# Patient Record
Sex: FEMALE | Race: BLACK OR AFRICAN AMERICAN | Employment: OTHER | URBAN - METROPOLITAN AREA
[De-identification: names, ages, dates, MRNs, and addresses within clinical notes are randomized per-mention and may not be internally consistent; named-entity substitution may affect disease eponyms.]

---

## 2017-03-01 ENCOUNTER — OP HISTORICAL/CONVERTED ENCOUNTER (OUTPATIENT)
Dept: OTHER | Age: 57
End: 2017-03-01

## 2021-12-28 ENCOUNTER — OFFICE VISIT (OUTPATIENT)
Dept: CARDIOLOGY CLINIC | Age: 61
End: 2021-12-28
Payer: COMMERCIAL

## 2021-12-28 VITALS
BODY MASS INDEX: 38.89 KG/M2 | OXYGEN SATURATION: 99 % | WEIGHT: 242 LBS | DIASTOLIC BLOOD PRESSURE: 88 MMHG | HEART RATE: 73 BPM | SYSTOLIC BLOOD PRESSURE: 133 MMHG | HEIGHT: 66 IN

## 2021-12-28 DIAGNOSIS — R07.9 CHEST PAIN, UNSPECIFIED TYPE: Primary | ICD-10-CM

## 2021-12-28 DIAGNOSIS — I82.5Y1 CHRONIC DEEP VEIN THROMBOSIS (DVT) OF PROXIMAL VEIN OF RIGHT LOWER EXTREMITY (HCC): ICD-10-CM

## 2021-12-28 DIAGNOSIS — R06.02 SHORTNESS OF BREATH: ICD-10-CM

## 2021-12-28 PROCEDURE — 99204 OFFICE O/P NEW MOD 45 MIN: CPT | Performed by: INTERNAL MEDICINE

## 2021-12-28 RX ORDER — ALPRAZOLAM 0.5 MG/1
0.5 TABLET ORAL
COMMUNITY

## 2021-12-28 RX ORDER — BACLOFEN 10 MG/1
10 TABLET ORAL DAILY
COMMUNITY

## 2021-12-28 RX ORDER — BUDESONIDE AND FORMOTEROL FUMARATE DIHYDRATE 80; 4.5 UG/1; UG/1
2 AEROSOL RESPIRATORY (INHALATION) DAILY
COMMUNITY
Start: 2021-11-08

## 2021-12-28 RX ORDER — PILOCARPINE HYDROCHLORIDE 5 MG/1
5 TABLET, FILM COATED ORAL 3 TIMES DAILY
COMMUNITY
Start: 2021-01-19

## 2021-12-28 RX ORDER — MOMETASONE FUROATE 1 MG/G
1 OINTMENT TOPICAL
COMMUNITY
Start: 2021-10-20

## 2021-12-28 RX ORDER — AZELASTINE HCL 205.5 UG/1
1 SPRAY NASAL
COMMUNITY

## 2021-12-28 RX ORDER — ACETIC ACID 20.65 MG/ML
1 SOLUTION AURICULAR (OTIC)
COMMUNITY
Start: 2021-08-03

## 2021-12-28 RX ORDER — LIFITEGRAST 50 MG/ML
1 SOLUTION/ DROPS OPHTHALMIC
COMMUNITY

## 2021-12-28 RX ORDER — GOLIMUMAB 50 MG/4ML
2 SOLUTION INTRAVENOUS AS DIRECTED
COMMUNITY

## 2021-12-28 RX ORDER — TRIAMCINOLONE ACETONIDE 1 MG/G
1 CREAM TOPICAL
COMMUNITY
Start: 2021-09-07

## 2021-12-28 NOTE — PROGRESS NOTES
HISTORY OF PRESENT ILLNESS  Thanh Miller is a 57 y.o. female. 12/2021  Patient seen today for new patient evaluation. She is here to establish care. She will have episode of shortness with chest pain. Describes fatigue and tiredness with shortness of breath on exertion. Feels chest tightness and pressure sensation happens on and off. Not clearly related to activity exertion. No clear radiation of this pain. She has history of DVT with chronic Coumadin treatment followed by vascular. Review of Systems   Constitutional: Negative for chills and fever. HENT: Negative for nosebleeds. Eyes: Negative for blurred vision and double vision. Respiratory: Positive for shortness of breath. Negative for cough, hemoptysis, sputum production and wheezing. Cardiovascular: Positive for chest pain. Negative for palpitations, orthopnea, claudication, leg swelling and PND. Gastrointestinal: Negative for abdominal pain, heartburn, nausea and vomiting. Musculoskeletal: Negative for myalgias. Skin: Negative for rash. Neurological: Negative for dizziness, weakness and headaches. Endo/Heme/Allergies: Does not bruise/bleed easily. Family History   Problem Relation Age of Onset    Heart Failure Mother     Atrial Fibrillation Mother     Heart Attack Father 46    Cancer Brother     Heart Attack Brother 48       Past Medical History:   Diagnosis Date    Autoimmune disease ([address]     [address] [address]     Thyroid disease        Past Surgical History:   Procedure Laterality Date    HX CHOLECYSTECTOMY      HX GI      HX GYN      VASCULAR SURGERY PROCEDURE UNLIST         Social History     Tobacco Use    Smoking status: Current Some Day Smoker    Smokeless tobacco: Never Used   Substance Use Topics    Alcohol use: Yes       Allergies   Allergen Reactions    Penicillins Itching       Prior to Admission medications    Medication Sig Start Date End Date Taking?  Authorizing Provider baclofen (LIORESAL) 10 mg tablet Take 10 mg by mouth daily. Yes Provider, Historical   budesonide-formoteroL (SYMBICORT) 80-4.5 mcg/actuation HFAA Take 2 Puffs by inhalation daily. 11/8/21  Yes Provider, Historical   azelastine (ASTEPRO) 205.5 mcg (0.15 %) 1 [place] by Both Nostrils route daily as needed. Yes Provider, Historical   ALPRAZolam (XANAX) 0.5 mg tablet Take 0.5 mg by mouth daily as needed. Yes Provider, Historical   acetic acid (VOSOL) 2 % otic solution Administer 1 Drop into each ear daily as needed. 8/3/21  Yes Provider, Historical   lifitegrast (Xiidra) 5 % dpet Administer 1 Drop to both eyes daily as needed. Yes Provider, Historical   golimumab (Simponi ARIA) 12.5 mg/mL soln solution 2 mg/kg by IntraVENous route as directed. Every other month   Yes Provider, Historical   pilocarpine (SALAGEN) 5 mg tablet Take 5 mg by mouth three (3) times daily. 1/19/21  Yes Provider, Historical   mometasone (ELOCON) 0.1 % ointment Apply 1 Pump to affected area daily as needed. 10/20/21  Yes Provider, Historical   triamcinolone acetonide (KENALOG) 0.1 % topical cream Apply 1 Pump to affected area daily as needed. 9/7/21  Yes Provider, Historical   warfarin (COUMADIN) 10 mg tablet As directed 5/24/16  Yes Provider, Historical   levothyroxine (SYNTHROID) 175 mcg tablet Take 175 mcg by mouth Daily (before breakfast). 5/24/16  Yes Provider, Historical   hydroxychloroquine (PLAQUENIL) 200 mg tablet Take 200 mg by mouth two (2) times a day. 6/3/16  Yes Provider, Historical   furosemide (LASIX) 40 mg tablet Take 40 mg by mouth daily as needed. 5/24/16  Yes Provider, Historical   montelukast (SINGULAIR) 10 mg tablet Take 10 mg by mouth daily.  7/28/16  Yes Provider, Historical         Visit Vitals  /88 (BP 1 Location: Right arm, BP Patient Position: Sitting, BP Cuff Size: Large adult)   Pulse 73   Ht 5' 6\" (1.676 m)   Wt 109.8 kg (242 lb)   SpO2 99%   BMI 39.06 kg/m²         Physical Exam  Constitutional: Appearance: She is well-developed. HENT:      Head: Normocephalic and atraumatic. Eyes:      Conjunctiva/sclera: Conjunctivae normal.   Neck:      Thyroid: No thyromegaly. Vascular: No JVD. Trachea: No tracheal deviation. Cardiovascular:      Rate and Rhythm: Normal rate and regular rhythm. Chest Wall: PMI is not displaced. Pulses: No decreased pulses. Heart sounds: No murmur heard. No gallop. No S3 sounds. Pulmonary:      Effort: No respiratory distress. Breath sounds: No wheezing or rales. Chest:      Chest wall: No tenderness. Abdominal:      Palpations: Abdomen is soft. Tenderness: There is no abdominal tenderness. Musculoskeletal:      Cervical back: Neck supple. Skin:     General: Skin is warm. Neurological:      Mental Status: She is alert and oriented to person, place, and time. Ms. Sharlene Bro has a reminder for a \"due or due soon\" health maintenance. I have asked that she contact her primary care provider for follow-up on this health maintenance. No flowsheet data found. I have personally reviewed patient's records available from hospital and other providers and incorporated findings in patient care. Provider note, lab, DVT study, EKG, chest x-ray  Chest -- Nuclear Medicine       Impression 2017  Performed by RADIOLOGY       THIS MYOCARDIAL PERFUSION STUDY IS ABNORMAL    THIS IS A LOW RISK STUDY    A partially reversible anteroapical defect is present suggesting LAD distribution disease    The calculated LVEF was 57%.    Overall normal LV systolic function and wall motion with an EF of 57%    Right ventricle size appears unable to assess    Impression  Performed by RADIOLOGY  : Echo 2017 NORMAL LEFT VENTRICULAR CAVITY SIZE AND SYSTOLIC FUNCTION WITH AN EJECTION FRACTION OF 55 %.     MILD (GRADE I) DIASTOLIC DYSFUNCTION.    UNABLE TO ASSESS PULMONARY ARTERY PRESSURE DUE TO LACK OF TR JET.    STRUCTURALLY NORMAL MITRAL VALVE WITH MILD THICKENING/CALCIFICATION OF THE ANTERIOR    MITRAL VALVE LEAFLET.    MILDLY SCLEROTIC AORTIC VALVE WITH NO EVIDENCE OF AORTIC REGURGITATION OR STENOSIS.    TRACE MITRAL REGURGITATION.    NO EVIDENCE OF SIGNIFICANT TRICUSPID OR PULMONIC REGURGITATION.    NO PREVIOUS REPORT FOR COMPARISON. Assessment         ICD-10-CM ICD-9-CM    1. Chest pain, unspecified type  R07.9 786.50 ECHO ADULT COMPLETE      NUCLEAR CARDIAC STRESS TEST      CT HEART W/O CONT WITH CALCIUM      LIPID PANEL   2. Shortness of breath  R06.02 786.05 ECHO ADULT COMPLETE      NUCLEAR CARDIAC STRESS TEST      CT HEART W/O CONT WITH CALCIUM      LIPID PANEL   3. Chronic deep vein thrombosis (DVT) of proximal vein of right lower extremity (HCC)  I82.5Y1 453.51 CT HEART W/O CONT WITH CALCIUM   12/2021  Seen with new onset chest pain and shortness of breath history of chronic DVT. Strong family history of coronary artery disease. Medications Discontinued During This Encounter   Medication Reason    methotrexate (RHEUMATREX) 2.5 mg tablet Not A Current Medication    cevimeline (EVOXAC) 30 mg capsule Not A Current Medication    folic acid (FOLVITE) 1 mg tablet Not A Current Medication       Orders Placed This Encounter    CT HEART W/O CONT WITH CALCIUM     Standing Status:   Future     Standing Expiration Date:   1/28/2023    LIPID PANEL     Standing Status:   Future     Standing Expiration Date:   6/28/2022    ECHO ADULT COMPLETE     Standing Status:   Future     Standing Expiration Date:   12/28/2022     Order Specific Question:   Contrast Enhancement (Bubble Study, Definity, Optison) may be used if criteria listed in established evidence-based protocol has been identified. Answer:   Yes       Follow-up and Dispositions    · Return for F/u after tests.

## 2021-12-28 NOTE — PROGRESS NOTES
Terry Granados presents today for   Chief Complaint   Patient presents with    New Patient     self ref, consultation, old cardiologist retired    Palpitations     at times, racing    Family Problem     Family history of heart problems       Terry Granados preferred language for health care discussion is english/other. Is someone accompanying this pt? no    Is the patient using any DME equipment during [address]? no    Depression Screening:  3 most recent PHQ Screens 12/28/2021   Little interest or pleasure in doing things Not at all   Feeling down, depressed, irritable, or hopeless Not at all   Total Score PHQ 2 0       Learning Assessment:  Learning Assessment 12/28/2021   PRIMARY LEARNER Patient   PRIMARY LANGUAGE ENGLISH   LEARNER PREFERENCE PRIMARY DEMONSTRATION   ANSWERED BY patient   RELATIONSHIP SELF       Abuse Screening:  Abuse Screening Questionnaire 12/28/2021   Do you ever feel afraid of your partner? N   Are you in a relationship with someone who physically or mentally threatens you? N   Is it safe for you to go home? Y             Pt currently taking Anticoagulant therapy? Warfarin 10 mg daily except 5 mg on Tuesday **PCP follows, Patient checks INR at home**    Pt currently taking Antiplatelet therapy ? no      Coordination of Care:  1. Have you been to the ER, urgent care clinic since your last visit? Hospitalized since your last visit? no    2. Have you seen or consulted any other health care providers outside of the [address] [place] [address] since your last visit? Include any pap smears or colon screening.  no

## 2021-12-29 LAB
CHOLEST SERPL-MCNC: 172 MG/DL (ref 110–200)
HDLC SERPL-MCNC: 2.8 MG/DL (ref 0–5)
HDLC SERPL-MCNC: 62 MG/DL
LDL/HDL RATIO,LDHD: 1.6
LDLC SERPL CALC-MCNC: 99 MG/DL (ref 50–99)
NON-HDL CHOLESTEROL, 011976: 110 MG/DL
TRIGL SERPL-MCNC: 58 MG/DL (ref 40–149)
VLDLC SERPL CALC-MCNC: 12 MG/DL (ref 8–30)

## 2022-02-07 ENCOUNTER — OFFICE VISIT (OUTPATIENT)
Dept: CARDIOLOGY CLINIC | Age: 62
End: 2022-02-07
Payer: COMMERCIAL

## 2022-02-07 VITALS
DIASTOLIC BLOOD PRESSURE: 77 MMHG | WEIGHT: 247 LBS | BODY MASS INDEX: 39.87 KG/M2 | SYSTOLIC BLOOD PRESSURE: 124 MMHG | OXYGEN SATURATION: 100 % | HEART RATE: 75 BPM

## 2022-02-07 DIAGNOSIS — R06.02 SHORTNESS OF BREATH: ICD-10-CM

## 2022-02-07 DIAGNOSIS — R07.9 CHEST PAIN, UNSPECIFIED TYPE: Primary | ICD-10-CM

## 2022-02-07 PROCEDURE — 99213 OFFICE O/P EST LOW 20 MIN: CPT | Performed by: INTERNAL MEDICINE

## 2022-02-07 RX ORDER — PREGABALIN 25 MG/1
CAPSULE ORAL
COMMUNITY

## 2022-02-07 NOTE — PROGRESS NOTES
1. Have you been to the ER, urgent care clinic since your last visit? Hospitalized since your last visit? No    2. Have you seen or consulted any other health care providers outside of the [address] [place] [address] since your last visit? Include any pap smears or colon screening.  No

## 2022-02-07 NOTE — PROGRESS NOTES
HISTORY OF PRESENT ILLNESS  Mary Arnold is a 57 y.o. female. 12/2021  Patient seen today for new patient evaluation. She is here to establish care. She will have episode of shortness with chest pain. Describes fatigue and tiredness with shortness of breath on exertion. Feels chest tightness and pressure sensation happens on and off. Not clearly related to activity exertion. No clear radiation of this pain. She has history of DVT with chronic Coumadin treatment followed by vascular. Review of Systems   Constitutional: Negative for chills and fever. HENT: Negative for nosebleeds. Eyes: Negative for blurred vision and double vision. Respiratory: Positive for shortness of breath. Negative for cough, hemoptysis, sputum production and wheezing. Cardiovascular: Positive for chest pain. Negative for palpitations, orthopnea, claudication, leg swelling and PND. Gastrointestinal: Negative for abdominal pain, heartburn, nausea and vomiting. Musculoskeletal: Negative for myalgias. Skin: Negative for rash. Neurological: Negative for dizziness, weakness and headaches. Endo/Heme/Allergies: Does not bruise/bleed easily. Family History   Problem Relation Age of Onset    Heart Failure Mother     Atrial Fibrillation Mother     Heart Attack Father 46    Cancer Brother     Heart Attack Brother 48       Past Medical History:   Diagnosis Date    Autoimmune disease ([address]     Thromboembolus ([address]     Thyroid disease        Past Surgical History:   Procedure Laterality Date    HX CHOLECYSTECTOMY      HX GI      HX GYN      VASCULAR SURGERY PROCEDURE UNLIST         Social History     Tobacco Use    Smoking status: Current Some Day Smoker    Smokeless tobacco: Never Used   Substance Use Topics    Alcohol use: Yes       Allergies   Allergen Reactions    Penicillins Itching       Prior to Admission medications    Medication Sig Start Date End Date Taking?  Authorizing Provider pregabalin (LYRICA) 25 mg capsule pregabalin 25 mg capsule   Yes Provider, Historical   baclofen (LIORESAL) 10 mg tablet Take 10 mg by mouth daily. Yes Provider, Historical   budesonide-formoteroL (SYMBICORT) 80-4.5 mcg/actuation HFAA Take 2 Puffs by inhalation daily. 11/8/21  Yes Provider, Historical   azelastine (ASTEPRO) 205.5 mcg (0.15 %) 1 [place] by Both Nostrils route daily as needed. Yes Provider, Historical   acetic acid (VOSOL) 2 % otic solution Administer 1 Drop into each ear daily as needed. 8/3/21  Yes Provider, Historical   lifitegrast (Xiidra) 5 % dpet Administer 1 Drop to both eyes daily as needed. Yes Provider, Historical   golimumab (Simponi ARIA) 12.5 mg/mL soln solution 2 mg/kg by IntraVENous route as directed. Every other month   Yes Provider, Historical   pilocarpine (SALAGEN) 5 mg tablet Take 5 mg by mouth three (3) times daily. 1/19/21  Yes Provider, Historical   mometasone (ELOCON) 0.1 % ointment Apply 1 Pump to affected area daily as needed. 10/20/21  Yes Provider, Historical   triamcinolone acetonide (KENALOG) 0.1 % topical cream Apply 1 Pump to affected area daily as needed. 9/7/21  Yes Provider, Historical   warfarin (COUMADIN) 10 mg tablet As directed 5/24/16  Yes Provider, Historical   levothyroxine (SYNTHROID) 175 mcg tablet Take 175 mcg by mouth Daily (before breakfast). 5/24/16  Yes Provider, Historical   hydroxychloroquine (PLAQUENIL) 200 mg tablet Take 200 mg by mouth two (2) times a day. 6/3/16  Yes Provider, Historical   furosemide (LASIX) 40 mg tablet Take 40 mg by mouth daily as needed. 5/24/16  Yes Provider, Historical   montelukast (SINGULAIR) 10 mg tablet Take 10 mg by mouth daily. 7/28/16  Yes Provider, Historical   ALPRAZolam (XANAX) 0.5 mg tablet Take 0.5 mg by mouth daily as needed.   Patient not taking: Reported on 2/7/2022    Provider, Historical         Visit Vitals  /77 (BP 1 Location: Left upper arm, BP Patient Position: Sitting, BP Cuff Size: Adult) Pulse 75   Wt 112 kg (247 lb)   SpO2 100%   BMI 39.87 kg/m²         Physical Exam  Constitutional:       Appearance: She is well-developed. HENT:      Head: Normocephalic and atraumatic. Eyes:      Conjunctiva/sclera: Conjunctivae normal.   Neck:      Thyroid: No thyromegaly. Vascular: No JVD. Trachea: No tracheal deviation. Cardiovascular:      Rate and Rhythm: Normal rate and regular rhythm. Chest Wall: PMI is not displaced. Pulses: No decreased pulses. Heart sounds: No murmur heard. No gallop. No S3 sounds. Pulmonary:      Effort: No respiratory distress. Breath sounds: No wheezing or rales. Chest:      Chest wall: No tenderness. Abdominal:      Palpations: Abdomen is soft. Tenderness: There is no abdominal tenderness. Musculoskeletal:      Cervical back: Neck supple. Skin:     General: Skin is warm. Neurological:      Mental Status: She is alert and oriented to person, place, and time. Ms. Rina Bro has a reminder for a \"due or due soon\" health maintenance. I have asked that she contact her primary care provider for follow-up on this health maintenance. No flowsheet data found. I have personally reviewed patient's records available from hospital and other providers and incorporated findings in patient care. Provider note, lab, DVT study, EKG, chest x-ray  Chest -- Nuclear Medicine       Impression 2017  Performed by RADIOLOGY       THIS MYOCARDIAL PERFUSION STUDY IS ABNORMAL    THIS IS A LOW RISK STUDY    A partially reversible anteroapical defect is present suggesting LAD distribution disease    The calculated LVEF was 57%.    Overall normal LV systolic function and wall motion with an EF of 57%    Right ventricle size appears unable to assess    Impression  Performed by RADIOLOGY  : Echo 2017 NORMAL LEFT VENTRICULAR CAVITY SIZE AND SYSTOLIC FUNCTION WITH AN EJECTION FRACTION OF 55 %.     MILD (GRADE I) DIASTOLIC DYSFUNCTION.    UNABLE TO ASSESS PULMONARY ARTERY PRESSURE DUE TO LACK OF TR JET.    STRUCTURALLY NORMAL MITRAL VALVE WITH MILD THICKENING/CALCIFICATION OF THE ANTERIOR    MITRAL VALVE LEAFLET.    MILDLY SCLEROTIC AORTIC VALVE WITH NO EVIDENCE OF AORTIC REGURGITATION OR STENOSIS.    TRACE MITRAL REGURGITATION.    NO EVIDENCE OF SIGNIFICANT TRICUSPID OR PULMONIC REGURGITATION.    NO PREVIOUS REPORT FOR COMPARISON. Ericka White MD [facility] [place], [facility] [place], Test Supervisor 1/27/2022       Interpretation Summary         Nuclear Findings: Normal left ventricular systolic function post-stress. LVEF measures 65%.   Nuclear Findings: Normal pharmacological myocardial perfusion study. LVEF measures 65%.   Nuclear Findings: LVEF measures 65%. LV perfusion is normal.    ECG: Resting ECG demonstrates normal sinus rhythm.   ECG: Stress ECG was negative for ischemia.   Stress Test: A pharmacological stress test was performed using regadenoson. Hemodynamics are adequate for diagnosis. Blood pressure demonstrated a normal response and heart rate demonstrated a normal response to stress. The patient reported no symptoms during the stress test.       Interpretation Summary         Left Ventricle: Left ventricle size is normal. Normal wall thickness. Normal wall motion. Normal left ventricular systolic function with a visually estimated EF of 60 - 65%. Normal diastolic function. Assessment         ICD-10-CM ICD-9-CM    1. Chest pain, unspecified type  R07.9 786.50     Atypical stable negative nuclear stress test monitor   2. Shortness of breath  R06.02 786.05     Stable normal ejection fraction monitor   12/2021  Seen with new onset chest pain and shortness of breath history of chronic DVT. Strong family history of coronary artery disease. 2/2022  Symptoms are stable. Negative nuclear stress test normal ejection fraction. Awaiting CT scan for coronary calcium score. LDL 99.   Depending on the CT result decide on aspirin and statin  There are no discontinued medications. No orders of the defined types were placed in this encounter. Follow-up and Dispositions    · Return in about 1 year (around 2/7/2023).

## 2022-02-10 ENCOUNTER — TELEPHONE (OUTPATIENT)
Dept: CARDIOLOGY CLINIC | Age: 62
End: 2022-02-10

## 2022-02-10 NOTE — TELEPHONE ENCOUNTER
Patients insurance is requiring a peer to peer for the calcium scoring test that she is scheduled for tomorrow. Please call [employer] at [phone] for peer to peer.

## 2022-02-10 NOTE — TELEPHONE ENCOUNTER
Patient is aware she has to pay 89. She voices understanding and acceptance of this advice and will call back if any further questions or concerns.

## 2022-02-11 ENCOUNTER — TELEPHONE (OUTPATIENT)
Dept: CARDIOLOGY CLINIC | Age: 62
End: 2022-02-11

## 2022-02-11 ENCOUNTER — HOSPITAL ENCOUNTER (OUTPATIENT)
Dept: CT IMAGING | Age: 62
Discharge: HOME | End: 2022-02-11
Attending: INTERNAL MEDICINE
Payer: SELF-PAY

## 2022-02-11 DIAGNOSIS — R06.02 SHORTNESS OF BREATH: ICD-10-CM

## 2022-02-11 DIAGNOSIS — I25.84 CORONARY ARTERY CALCIFICATION: Primary | ICD-10-CM

## 2022-02-11 DIAGNOSIS — I25.10 CORONARY ARTERY CALCIFICATION: Primary | ICD-10-CM

## 2022-02-11 DIAGNOSIS — R07.9 CHEST PAIN, UNSPECIFIED TYPE: ICD-10-CM

## 2022-02-11 DIAGNOSIS — I82.5Y1 CHRONIC DEEP VEIN THROMBOSIS (DVT) OF PROXIMAL VEIN OF RIGHT LOWER EXTREMITY (HCC): ICD-10-CM

## 2022-02-11 PROCEDURE — 75571 CT HRT W/O DYE W/CA TEST: CPT

## 2022-02-11 RX ORDER — ATORVASTATIN CALCIUM 20 MG/1
20 TABLET, FILM COATED ORAL DAILY
Qty: 30 TABLET | Refills: 3 | Status: SHIPPED | OUTPATIENT
Start: 2022-02-11 | End: 2022-06-24

## 2022-02-11 NOTE — TELEPHONE ENCOUNTER
----- Message from Fina Muñoz MD sent at 2/11/2022 11:30 AM EST -----  There is mild coronary calcification noted on CAT scan. Would recommend aspirin 81 mg a day and atorvastatin 20 mg a day. One starting atorvastatin check lipid and LFT in 4 weeks.   Patient can keep previous follow-up

## 2022-02-11 NOTE — PROGRESS NOTES
There is mild coronary calcification noted on CAT scan. Would recommend aspirin 81 mg a day and atorvastatin 20 mg a day. One starting atorvastatin check lipid and LFT in 4 weeks.   Patient can keep previous follow-up

## 2022-02-11 NOTE — TELEPHONE ENCOUNTER
Spoke to patient regarding lab/test per Dr. Neeta Fernández, test reviewed. There is mild coronary calcification noted on CT scan. Would recommend aspirin 81 mg a day and atorvastatin 20 mg a day. Once starting atorvastatin check Lipid/lft in 4 weeks. Patient can keep previous follow up. She voices understanding and acceptance of this advice and will call back if any further questions or concerns.

## 2022-02-11 NOTE — TELEPHONE ENCOUNTER
Requested Prescriptions     Pending Prescriptions Disp Refills    atorvastatin (LIPITOR) 20 mg tablet 30 Tablet 3     Sig: Take 1 Tablet by mouth daily.

## 2022-06-24 RX ORDER — ATORVASTATIN CALCIUM 20 MG/1
TABLET, FILM COATED ORAL
Qty: 30 TABLET | Refills: 0 | Status: SHIPPED | OUTPATIENT
Start: 2022-06-24 | End: 2022-08-09 | Stop reason: SDUPTHER

## 2022-08-09 RX ORDER — ATORVASTATIN CALCIUM 20 MG/1
20 TABLET, FILM COATED ORAL DAILY
Qty: 90 TABLET | Refills: 3 | Status: SHIPPED | OUTPATIENT
Start: 2022-08-09 | End: 2022-08-12

## 2022-08-09 NOTE — TELEPHONE ENCOUNTER
Patient called and stated that she need a refill on her Atorvaststin but wants to know if she can get a 90 day supply instead of a 30 day supply

## 2022-08-12 RX ORDER — ATORVASTATIN CALCIUM 20 MG/1
TABLET, FILM COATED ORAL
Qty: 30 TABLET | Refills: 0 | Status: SHIPPED | OUTPATIENT
Start: 2022-08-12

## 2022-12-13 ENCOUNTER — OFFICE VISIT (OUTPATIENT)
Dept: CARDIOLOGY CLINIC | Age: 62
End: 2022-12-13
Payer: COMMERCIAL

## 2022-12-13 VITALS
OXYGEN SATURATION: 100 % | HEART RATE: 74 BPM | SYSTOLIC BLOOD PRESSURE: 152 MMHG | WEIGHT: 237 LBS | HEIGHT: 66 IN | DIASTOLIC BLOOD PRESSURE: 93 MMHG | BODY MASS INDEX: 38.09 KG/M2

## 2022-12-13 DIAGNOSIS — R06.02 SHORTNESS OF BREATH: ICD-10-CM

## 2022-12-13 DIAGNOSIS — I82.5Y1 CHRONIC DEEP VEIN THROMBOSIS (DVT) OF PROXIMAL VEIN OF RIGHT LOWER EXTREMITY (HCC): ICD-10-CM

## 2022-12-13 DIAGNOSIS — I25.10 CORONARY ARTERY CALCIFICATION: Primary | ICD-10-CM

## 2022-12-13 DIAGNOSIS — I10 PRIMARY HYPERTENSION: ICD-10-CM

## 2022-12-13 DIAGNOSIS — I25.84 CORONARY ARTERY CALCIFICATION: Primary | ICD-10-CM

## 2022-12-13 DIAGNOSIS — E78.5 HYPERLIPIDEMIA, UNSPECIFIED HYPERLIPIDEMIA TYPE: ICD-10-CM

## 2022-12-13 LAB
A-G RATIO,AGRAT: 1.3 RATIO (ref 1.1–2.6)
ALBUMIN SERPL-MCNC: 3.9 G/DL (ref 3.5–5)
ALP SERPL-CCNC: 62 U/L (ref 40–120)
ALT SERPL-CCNC: 16 U/L (ref 5–40)
ANION GAP SERPL CALC-SCNC: 7 MMOL/L (ref 3–15)
AST SERPL W P-5'-P-CCNC: 16 U/L (ref 10–37)
BILIRUB SERPL-MCNC: 0.3 MG/DL (ref 0.2–1.2)
BILIRUBIN, DIRECT,CBIL: <0.2 MG/DL (ref 0–0.3)
BUN SERPL-MCNC: 8 MG/DL (ref 6–22)
CALCIUM SERPL-MCNC: 8.8 MG/DL (ref 8.4–10.5)
CHLORIDE SERPL-SCNC: 104 MMOL/L (ref 98–110)
CHOLEST SERPL-MCNC: 121 MG/DL (ref 110–200)
CO2 SERPL-SCNC: 30 MMOL/L (ref 20–32)
CREAT SERPL-MCNC: 0.6 MG/DL (ref 0.8–1.4)
GLOBULIN,GLOB: 2.9 G/DL (ref 2–4)
GLOMERULAR FILTRATION RATE: >60 ML/MIN/1.73 SQ.M.
GLUCOSE SERPL-MCNC: 92 MG/DL (ref 70–99)
HDLC SERPL-MCNC: 2.1 MG/DL (ref 0–5)
HDLC SERPL-MCNC: 58 MG/DL
LDL/HDL RATIO,LDHD: 0.9
LDLC SERPL CALC-MCNC: 52 MG/DL (ref 50–99)
NON-HDL CHOLESTEROL, 011976: 63 MG/DL
POTASSIUM SERPL-SCNC: 4.3 MMOL/L (ref 3.5–5.5)
PROT SERPL-MCNC: 6.8 G/DL (ref 6.2–8.1)
SODIUM SERPL-SCNC: 141 MMOL/L (ref 133–145)
TRIGL SERPL-MCNC: 54 MG/DL (ref 40–149)
VLDLC SERPL CALC-MCNC: 11 MG/DL (ref 8–30)

## 2022-12-13 PROCEDURE — 3078F DIAST BP <80 MM HG: CPT | Performed by: INTERNAL MEDICINE

## 2022-12-13 PROCEDURE — 99214 OFFICE O/P EST MOD 30 MIN: CPT | Performed by: INTERNAL MEDICINE

## 2022-12-13 PROCEDURE — 3074F SYST BP LT 130 MM HG: CPT | Performed by: INTERNAL MEDICINE

## 2022-12-13 RX ORDER — ZOLPIDEM TARTRATE 10 MG/1
TABLET ORAL
COMMUNITY
Start: 2022-09-25

## 2022-12-13 RX ORDER — OLMESARTAN MEDOXOMIL 20 MG/1
20 TABLET ORAL DAILY
Qty: 90 TABLET | Refills: 3 | Status: SHIPPED | OUTPATIENT
Start: 2022-12-13

## 2022-12-13 RX ORDER — MELATONIN
1000 DAILY
COMMUNITY

## 2022-12-13 RX ORDER — ASPIRIN 81 MG/1
81 TABLET ORAL DAILY
COMMUNITY

## 2022-12-13 NOTE — PROGRESS NOTES
HISTORY OF PRESENT ILLNESS  Major Shaw is a 60 y.o. female. 12/2021  Patient seen today for new patient evaluation. She is here to establish care. She will have episode of shortness with chest pain. Describes fatigue and tiredness with shortness of breath on exertion. Feels chest tightness and pressure sensation happens on and off. Not clearly related to activity exertion. No clear radiation of this pain. She has history of DVT with chronic Coumadin treatment followed by vascular. Review of Systems   Constitutional:  Negative for chills and fever. HENT:  Negative for nosebleeds. Eyes:  Negative for blurred vision and double vision. Respiratory:  Positive for shortness of breath. Negative for cough, hemoptysis, sputum production and wheezing. Cardiovascular:  Positive for chest pain. Negative for palpitations, orthopnea, claudication, leg swelling and PND. Gastrointestinal:  Negative for abdominal pain, heartburn, nausea and vomiting. Musculoskeletal:  Negative for myalgias. Skin:  Negative for rash. Neurological:  Negative for dizziness, weakness and headaches. Endo/Heme/Allergies:  Does not bruise/bleed easily. Family History   Problem Relation Age of Onset    Heart Failure Mother     Atrial Fibrillation Mother     Heart Attack Father 46    Cancer Brother     Heart Attack Brother 48       Past Medical History:   Diagnosis Date    Autoimmune disease ([address]     Thromboembolus ([facility] [address]     Thyroid disease        Past Surgical History:   Procedure Laterality Date    HX CHOLECYSTECTOMY      HX GI      HX GYN      VASCULAR SURGERY PROCEDURE UNLIST         Social History     Tobacco Use    Smoking status: Some Days    Smokeless tobacco: Never   Substance Use Topics    Alcohol use: Yes       Allergies   Allergen Reactions    Penicillins Itching       Prior to Admission medications    Medication Sig Start Date End Date Taking?  Authorizing Provider   aspirin delayed-release 81 mg tablet Take 81 mg by mouth daily. Yes Provider, Historical   cholecalciferol (VITAMIN D3) (1000 Units /25 mcg) tablet Take 1,000 Units by mouth daily. Yes Provider, Historical   zolpidem (AMBIEN) 10 mg tablet TAKE 1 TABLET BY MOUTH NIGHTLY AS NEEDED 9/25/22  Yes Provider, Historical   olmesartan (BENICAR) 20 mg tablet Take 1 Tablet by mouth daily. 12/13/22  Yes Estevan Aguayo MD   atorvastatin (LIPITOR) 20 mg tablet Take 1 tablet by mouth once daily 8/12/22  Yes Estevan Aguayo MD   pregabalin (LYRICA) 25 mg capsule Take 100 mg by mouth two (2) times a day. Yes Provider, Historical   baclofen (LIORESAL) 10 mg tablet Take 10 mg by mouth daily. Yes Provider, Historical   budesonide-formoteroL (SYMBICORT) 80-4.5 mcg/actuation HFAA Take 2 Puffs by inhalation daily. 11/8/21  Yes Provider, Historical   azelastine (ASTEPRO) 205.5 mcg (0.15 %) 1 [place] by Both Nostrils route daily as needed. Yes Provider, Historical   acetic acid (VOSOL) 2 % otic solution Administer 1 Drop into each ear daily as needed. 8/3/21  Yes Provider, Historical   lifitegrast (Xiidra) 5 % dpet Administer 1 Drop to both eyes daily as needed. Yes Provider, Historical   golimumab (Simponi ARIA) 12.5 mg/mL soln solution 2 mg/kg by IntraVENous route as directed. Every other month   Yes Provider, Historical   pilocarpine (SALAGEN) 5 mg tablet Take 5 mg by mouth three (3) times daily. 1/19/21  Yes Provider, Historical   mometasone (ELOCON) 0.1 % ointment Apply 1 Pump to affected area daily as needed. 10/20/21  Yes Provider, Historical   triamcinolone acetonide (KENALOG) 0.1 % topical cream Apply 1 Pump to affected area daily as needed. 9/7/21  Yes Provider, Historical   warfarin (COUMADIN) 10 mg tablet As directed 5/24/16  Yes Provider, Historical   levothyroxine (SYNTHROID) 175 mcg tablet Take 175 mcg by mouth Daily (before breakfast).  5/24/16  Yes Provider, Historical   hydroxychloroquine (PLAQUENIL) 200 mg tablet Take 200 mg by mouth two (2) times a day. 6/3/16  Yes Provider, Historical   furosemide (LASIX) 40 mg tablet Take 40 mg by mouth daily as needed. 5/24/16  Yes Provider, Historical   montelukast (SINGULAIR) 10 mg tablet Take 10 mg by mouth daily. 7/28/16  Yes Provider, Historical   ALPRAZolam (XANAX) 0.5 mg tablet Take 0.5 mg by mouth daily as needed. Patient not taking: No sig reported    Provider, Historical         Visit Vitals  BP (!) 152/93 (BP 1 Location: Left upper arm, BP Patient Position: Sitting, BP Cuff Size: Adult)   Pulse 74   Ht 5' 6\" (1.676 m)   Wt 107.5 kg (237 lb)   SpO2 100%   BMI 38.25 kg/m²         Physical Exam  Constitutional:       Appearance: She is well-developed. HENT:      Head: Normocephalic and atraumatic. Eyes:      Conjunctiva/sclera: Conjunctivae normal.   Neck:      Thyroid: No thyromegaly. Vascular: No JVD. Trachea: No tracheal deviation. Cardiovascular:      Rate and Rhythm: Normal rate and regular rhythm. Chest Wall: PMI is not displaced. Pulses: No decreased pulses. Heart sounds: No murmur heard. No gallop. No S3 sounds. Pulmonary:      Effort: No respiratory distress. Breath sounds: No wheezing or rales. Chest:      Chest wall: No tenderness. Abdominal:      Palpations: Abdomen is soft. Tenderness: There is no abdominal tenderness. Musculoskeletal:      Cervical back: Neck supple. Skin:     General: Skin is warm. Neurological:      Mental Status: She is alert and oriented to person, place, and time. Ms. Gaetano Pace has a reminder for a \"due or due soon\" health maintenance. I have asked that she contact her primary care provider for follow-up on this health maintenance. No flowsheet data found. I have personally reviewed patient's records available from hospital and other providers and incorporated findings in patient care.   Provider note, lab, DVT study, EKG, chest x-ray  Chest -- Nuclear Medicine       Impression 2017  Performed by RADIOLOGY       THIS MYOCARDIAL PERFUSION STUDY IS ABNORMAL    THIS IS A LOW RISK STUDY    A partially reversible anteroapical defect is present suggesting LAD distribution disease    The calculated LVEF was 57%. Overall normal LV systolic function and wall motion with an EF of 57%    Right ventricle size appears unable to assess    Impression  Performed by RADIOLOGY  : Echo 2017 NORMAL LEFT VENTRICULAR CAVITY SIZE AND SYSTOLIC FUNCTION WITH AN EJECTION FRACTION OF 55 %. MILD (GRADE I) DIASTOLIC DYSFUNCTION. UNABLE TO ASSESS PULMONARY ARTERY PRESSURE DUE TO LACK OF TR JET. STRUCTURALLY NORMAL MITRAL VALVE WITH MILD THICKENING/CALCIFICATION OF THE ANTERIOR    MITRAL VALVE LEAFLET. MILDLY SCLEROTIC AORTIC VALVE WITH NO EVIDENCE OF AORTIC REGURGITATION OR STENOSIS. TRACE MITRAL REGURGITATION. NO EVIDENCE OF SIGNIFICANT TRICUSPID OR PULMONIC REGURGITATION. NO PREVIOUS REPORT FOR COMPARISON. Madelyn Phillips MD [facility] [place], [facility] [place], Test Supervisor 1/27/2022       Interpretation Summary 1/2022         Nuclear Findings: Normal left ventricular systolic function post-stress. LVEF measures 65%. Nuclear Findings: Normal pharmacological myocardial perfusion study. LVEF measures 65%. Nuclear Findings: LVEF measures 65%. LV perfusion is normal.    ECG: Resting ECG demonstrates normal sinus rhythm. ECG: Stress ECG was negative for ischemia. Stress Test: A pharmacological stress test was performed using regadenoson. Hemodynamics are adequate for diagnosis. Blood pressure demonstrated a normal response and heart rate demonstrated a normal response to stress. The patient reported no symptoms during the stress test.       Interpretation Summary 1/2022         Left Ventricle: Left ventricle size is normal. Normal wall thickness. Normal wall motion. Normal left ventricular systolic function with a visually estimated EF of 60 - 65%. Normal diastolic function.    CARDIOLOGY REPORT:    Coronary calcification 2/2022   The patient underwent a limited noncontrast CT scan of the chest with cardiac  gating to evaluate for coronary arterial calcification. Using the Agatston  method the coronary calcium score was calculated. There is mild coronary calcium  present . Coronary calcium score calculated at  33. IMPRESSION:  Coronary calcium score 33. Component 03/10/22 12/29/21 03/30/21 01/30/20 11/21/18 04/17/18   Cholesterol 129 172 178 158 159 170   Triglyceride 102 58 107 102 90 123   HDL 55 62 68 47 51 52   Cholesterol/HDL 2.3 2.8 2.6 3.4 3.1 3.3   Non-HDL Cholesterol 74 110 110 111 -- --   LDL CALCULATION 53 99 88 90 90 93   VLDL CALCULATION 20 12 21 20 18        I Have personally reviewed recent relevant labs available and discussed with patient  3/2022-lipid, LFT        Assessment         ICD-10-CM ICD-9-CM    1. Coronary artery calcification  N55.25 740.89 METABOLIC PANEL, BASIC    G36.51 414.4 LIPID PANEL      HEPATIC FUNCTION PANEL    Stable continue treatment monitor      2. Shortness of breath  R06.02 786.05     Stable symptom monitor      3. Chronic deep vein thrombosis (DVT) of proximal vein of right lower extremity (HCC)  I82.5Y1 453.51     Stable monitor      4. Hyperlipidemia, unspecified hyperlipidemia type  E78.5 272.4     Continue with statin monitor      5. Primary hypertension  C82 822.4 METABOLIC PANEL, BASIC      LIPID PANEL      HEPATIC FUNCTION PANEL      12/2021  Seen with new onset chest pain and shortness of breath history of chronic DVT. Strong family history of coronary artery disease. 2/2022  Symptoms are stable. Negative nuclear stress test normal ejection fraction. Awaiting CT scan for coronary calcium score. LDL 99. Depending on the CT result decide on aspirin and statin  12/2022  Mild coronary calcification continue with statin. Chronic DVT on anticoagulation monitored by PCP.   Negative nuclear stress test in past.  Follow-up labs  Blood pressure has been staying elevated I have added olmesartan. Follow-up BMP lipid and LFT. There are no discontinued medications. Orders Placed This Encounter    METABOLIC PANEL, BASIC     Standing Status:   Future     Standing Expiration Date:   1/12/2023    LIPID PANEL     Standing Status:   Future     Standing Expiration Date:   6/13/2023    HEPATIC FUNCTION PANEL     Standing Status:   Future     Standing Expiration Date:   6/13/2023    olmesartan (BENICAR) 20 mg tablet     Sig: Take 1 Tablet by mouth daily. Dispense:  90 Tablet     Refill:  3       Follow-up and Dispositions    Return in about 6 months (around 6/13/2023).

## 2022-12-13 NOTE — PROGRESS NOTES
1. Have you been to the ER, urgent care clinic since your last visit? Hospitalized since your last visit? Yes Where: [facility]    2. Have you seen or consulted any other health care providers outside of the [address] [place] [address] since your last visit? Include any pap smears or colon screening.  Yes Where: PCP, [facility]

## 2022-12-14 ENCOUNTER — TELEPHONE (OUTPATIENT)
Dept: CARDIOLOGY CLINIC | Age: 62
End: 2022-12-14

## 2022-12-14 NOTE — TELEPHONE ENCOUNTER
Called and left message regarding lab/test per Dr. Troy Lawson. Lab/test reviewed. Lab/test  reviewed. No significant abnormality.  If any questions to call office

## 2022-12-14 NOTE — TELEPHONE ENCOUNTER
----- Message from Kieran Pope MD sent at 12/14/2022  9:02 AM EST -----  Lab/test  reviewed  No significant abnormality

## 2023-02-27 ENCOUNTER — TELEPHONE (OUTPATIENT)
Age: 63
End: 2023-02-27

## 2023-02-27 ENCOUNTER — CLINICAL DOCUMENTATION (OUTPATIENT)
Age: 63
End: 2023-02-27

## 2023-02-27 NOTE — TELEPHONE ENCOUNTER
Called and left message on patient voicemail per Dr. Laura Black regarding BP. Increase Olmesartan to 40 mg a day. If any questions to call office.

## 2023-02-27 NOTE — TELEPHONE ENCOUNTER
Patient called and stated that she was put on Olmesartan and her bp yesterday was 174/104 this morning it was 160/94 she said its been high and she woukd like to know what to do.

## 2023-06-23 ENCOUNTER — OFFICE VISIT (OUTPATIENT)
Age: 63
End: 2023-06-23
Payer: COMMERCIAL

## 2023-06-23 VITALS
OXYGEN SATURATION: 97 % | BODY MASS INDEX: 40.98 KG/M2 | HEIGHT: 66 IN | WEIGHT: 255 LBS | DIASTOLIC BLOOD PRESSURE: 72 MMHG | SYSTOLIC BLOOD PRESSURE: 120 MMHG | HEART RATE: 71 BPM

## 2023-06-23 DIAGNOSIS — I82.5Y1 CHRONIC EMBOLISM AND THROMBOSIS OF UNSPECIFIED DEEP VEINS OF RIGHT PROXIMAL LOWER EXTREMITY (HCC): ICD-10-CM

## 2023-06-23 DIAGNOSIS — I25.10 ATHEROSCLEROSIS OF NATIVE CORONARY ARTERY OF NATIVE HEART WITHOUT ANGINA PECTORIS: Primary | ICD-10-CM

## 2023-06-23 DIAGNOSIS — E78.5 HYPERLIPIDEMIA, UNSPECIFIED HYPERLIPIDEMIA TYPE: ICD-10-CM

## 2023-06-23 DIAGNOSIS — I10 ESSENTIAL (PRIMARY) HYPERTENSION: ICD-10-CM

## 2023-06-23 DIAGNOSIS — R60.0 BILATERAL LEG EDEMA: ICD-10-CM

## 2023-06-23 PROCEDURE — 99214 OFFICE O/P EST MOD 30 MIN: CPT | Performed by: INTERNAL MEDICINE

## 2023-06-23 PROCEDURE — 3078F DIAST BP <80 MM HG: CPT | Performed by: INTERNAL MEDICINE

## 2023-06-23 PROCEDURE — 3074F SYST BP LT 130 MM HG: CPT | Performed by: INTERNAL MEDICINE

## 2023-06-23 RX ORDER — ALBUTEROL SULFATE 90 UG/1
AEROSOL, METERED RESPIRATORY (INHALATION)
COMMUNITY
Start: 2020-07-08

## 2023-06-23 RX ORDER — BUMETANIDE 2 MG/1
2 TABLET ORAL DAILY
Qty: 90 TABLET | Refills: 3 | Status: SHIPPED | OUTPATIENT
Start: 2023-06-23

## 2023-06-23 ASSESSMENT — PATIENT HEALTH QUESTIONNAIRE - PHQ9
SUM OF ALL RESPONSES TO PHQ QUESTIONS 1-9: 0
SUM OF ALL RESPONSES TO PHQ QUESTIONS 1-9: 0
2. FEELING DOWN, DEPRESSED OR HOPELESS: 0
DEPRESSION UNABLE TO ASSESS: FUNCTIONAL CAPACITY MOTIVATION LIMITS ACCURACY
SUM OF ALL RESPONSES TO PHQ9 QUESTIONS 1 & 2: 0
SUM OF ALL RESPONSES TO PHQ QUESTIONS 1-9: 0
1. LITTLE INTEREST OR PLEASURE IN DOING THINGS: 0
SUM OF ALL RESPONSES TO PHQ QUESTIONS 1-9: 0

## 2023-06-23 NOTE — PROGRESS NOTES
1. Have you been to the ER, urgent care clinic since your last visit? Hospitalized since your last visit? Yes, [facility]    2. Have you seen or consulted any other health care providers outside of the [address] [place] [address] since your last visit? Include any pap smears or colon screening.       No

## 2023-06-23 NOTE — PROGRESS NOTES
HISTORY OF PRESENT ILLNESS  Rylie Caba is a 60 y.o. female. 2021  Patient seen today for new patient evaluation. She is here to establish care. She will have episode of shortness with chest pain. Describes fatigue and tiredness with shortness of breath on exertion. Feels chest tightness and pressure sensation happens on and off. Not clearly related to activity exertion. No clear radiation of this pain. She has history of DVT with chronic Coumadin treatment followed by vascular. Review of Systems   Constitutional:  Negative for chills and fever. HENT:  Negative for nosebleeds. Eyes:  Negative for blurred vision and double vision. Respiratory:  Positive for shortness of breath. Negative for cough, hemoptysis, sputum production and wheezing. Cardiovascular:  Positive for chest pain. Negative for palpitations, orthopnea, claudication, leg swelling and PND. Gastrointestinal:  Negative for abdominal pain, heartburn, nausea and vomiting. Musculoskeletal:  Negative for myalgias. Skin:  Negative for rash. Neurological:  Negative for dizziness, weakness and headaches. Endo/Heme/Allergies:  Does not bruise/bleed easily. Family History   Problem Relation Age of Onset    Heart Attack Brother 48    Cancer Brother     Heart Attack Father 46    Atrial Fibrillation Mother     Heart Failure Mother        Past Medical History:   Diagnosis Date    Autoimmune disease ([address]     Thromboembolus ([facility] [address]     Thyroid disease        Past Surgical History:   Procedure Laterality Date    CHOLECYSTECTOMY      GI      GYN      VASCULAR SURGERY         Social History     Tobacco Use    Smoking status: Former     Types: Cigarettes     Quit date: 2022     Years since quittin.4    Smokeless tobacco: Never   Substance Use Topics    Alcohol use: Yes       Allergies   Allergen Reactions    Penicillins Itching       Prior to Admission medications    Medication Sig Start Date End Date Taking?

## 2023-10-04 RX ORDER — ATORVASTATIN CALCIUM 20 MG/1
TABLET, FILM COATED ORAL
Qty: 30 TABLET | Refills: 0 | Status: SHIPPED | OUTPATIENT
Start: 2023-10-04

## 2023-12-27 RX ORDER — OLMESARTAN MEDOXOMIL 20 MG/1
20 TABLET ORAL DAILY
Qty: 90 TABLET | Refills: 0 | Status: SHIPPED | OUTPATIENT
Start: 2023-12-27

## 2024-01-09 ENCOUNTER — OFFICE VISIT (OUTPATIENT)
Age: 64
End: 2024-01-09
Payer: COMMERCIAL

## 2024-01-09 VITALS
HEIGHT: 66 IN | SYSTOLIC BLOOD PRESSURE: 108 MMHG | WEIGHT: 234 LBS | DIASTOLIC BLOOD PRESSURE: 65 MMHG | OXYGEN SATURATION: 97 % | BODY MASS INDEX: 37.61 KG/M2 | HEART RATE: 68 BPM

## 2024-01-09 DIAGNOSIS — I10 ESSENTIAL (PRIMARY) HYPERTENSION: ICD-10-CM

## 2024-01-09 DIAGNOSIS — I25.10 ATHEROSCLEROSIS OF NATIVE CORONARY ARTERY OF NATIVE HEART WITHOUT ANGINA PECTORIS: Primary | ICD-10-CM

## 2024-01-09 DIAGNOSIS — E78.5 HYPERLIPIDEMIA, UNSPECIFIED HYPERLIPIDEMIA TYPE: ICD-10-CM

## 2024-01-09 DIAGNOSIS — R60.0 BILATERAL LEG EDEMA: ICD-10-CM

## 2024-01-09 DIAGNOSIS — I82.5Y1 CHRONIC EMBOLISM AND THROMBOSIS OF UNSPECIFIED DEEP VEINS OF RIGHT PROXIMAL LOWER EXTREMITY (HCC): ICD-10-CM

## 2024-01-09 PROCEDURE — 3078F DIAST BP <80 MM HG: CPT | Performed by: INTERNAL MEDICINE

## 2024-01-09 PROCEDURE — 99214 OFFICE O/P EST MOD 30 MIN: CPT | Performed by: INTERNAL MEDICINE

## 2024-01-09 PROCEDURE — 3074F SYST BP LT 130 MM HG: CPT | Performed by: INTERNAL MEDICINE

## 2024-01-09 RX ORDER — ATORVASTATIN CALCIUM 20 MG/1
20 TABLET, FILM COATED ORAL DAILY
Qty: 90 TABLET | Refills: 3 | Status: SHIPPED | OUTPATIENT
Start: 2024-01-09

## 2024-01-09 ASSESSMENT — PATIENT HEALTH QUESTIONNAIRE - PHQ9
2. FEELING DOWN, DEPRESSED OR HOPELESS: 0
SUM OF ALL RESPONSES TO PHQ QUESTIONS 1-9: 0
DEPRESSION UNABLE TO ASSESS: FUNCTIONAL CAPACITY MOTIVATION LIMITS ACCURACY
SUM OF ALL RESPONSES TO PHQ QUESTIONS 1-9: 0
1. LITTLE INTEREST OR PLEASURE IN DOING THINGS: 0
SUM OF ALL RESPONSES TO PHQ9 QUESTIONS 1 & 2: 0
SUM OF ALL RESPONSES TO PHQ QUESTIONS 1-9: 0
SUM OF ALL RESPONSES TO PHQ QUESTIONS 1-9: 0

## 2024-03-27 RX ORDER — OLMESARTAN MEDOXOMIL 20 MG/1
20 TABLET ORAL DAILY
Qty: 90 TABLET | Refills: 3 | Status: SHIPPED | OUTPATIENT
Start: 2024-03-27

## 2024-06-17 RX ORDER — BUMETANIDE 2 MG/1
2 TABLET ORAL DAILY
Qty: 90 TABLET | Refills: 3 | Status: SHIPPED | OUTPATIENT
Start: 2024-06-17

## 2024-07-01 ENCOUNTER — OFFICE VISIT (OUTPATIENT)
Age: 64
End: 2024-07-01
Payer: COMMERCIAL

## 2024-07-01 VITALS
SYSTOLIC BLOOD PRESSURE: 99 MMHG | BODY MASS INDEX: 35.68 KG/M2 | HEIGHT: 66 IN | DIASTOLIC BLOOD PRESSURE: 64 MMHG | HEART RATE: 81 BPM | WEIGHT: 222 LBS

## 2024-07-01 DIAGNOSIS — E66.01 SEVERE OBESITY (BMI 35.0-39.9) WITH COMORBIDITY (HCC): ICD-10-CM

## 2024-07-01 DIAGNOSIS — I25.84 CORONARY ATHEROSCLEROSIS DUE TO CALCIFIED CORONARY LESION (CODE): ICD-10-CM

## 2024-07-01 DIAGNOSIS — I10 ESSENTIAL (PRIMARY) HYPERTENSION: Primary | ICD-10-CM

## 2024-07-01 DIAGNOSIS — E78.5 HYPERLIPIDEMIA, UNSPECIFIED HYPERLIPIDEMIA TYPE: ICD-10-CM

## 2024-07-01 DIAGNOSIS — R60.0 BILATERAL LEG EDEMA: ICD-10-CM

## 2024-07-01 PROCEDURE — 3078F DIAST BP <80 MM HG: CPT | Performed by: INTERNAL MEDICINE

## 2024-07-01 PROCEDURE — 3074F SYST BP LT 130 MM HG: CPT | Performed by: INTERNAL MEDICINE

## 2024-07-01 PROCEDURE — 99214 OFFICE O/P EST MOD 30 MIN: CPT | Performed by: INTERNAL MEDICINE

## 2024-07-01 RX ORDER — OLMESARTAN MEDOXOMIL 20 MG/1
10 TABLET ORAL DAILY
Qty: 90 TABLET | Refills: 1 | Status: SHIPPED | OUTPATIENT
Start: 2024-07-01

## 2024-07-01 ASSESSMENT — PATIENT HEALTH QUESTIONNAIRE - PHQ9
SUM OF ALL RESPONSES TO PHQ QUESTIONS 1-9: 0
2. FEELING DOWN, DEPRESSED OR HOPELESS: NOT AT ALL
SUM OF ALL RESPONSES TO PHQ QUESTIONS 1-9: 0
SUM OF ALL RESPONSES TO PHQ9 QUESTIONS 1 & 2: 0
DEPRESSION UNABLE TO ASSESS: FUNCTIONAL CAPACITY MOTIVATION LIMITS ACCURACY
1. LITTLE INTEREST OR PLEASURE IN DOING THINGS: NOT AT ALL

## 2024-07-01 ASSESSMENT — ENCOUNTER SYMPTOMS
EYES NEGATIVE: 1
RESPIRATORY NEGATIVE: 1
GASTROINTESTINAL NEGATIVE: 1

## 2024-07-01 NOTE — PROGRESS NOTES
Yecenia Fuller is a 63 y.o. year old female.    Follow-up of CAD, hypertension, hyperlipidemia  History of DVT, lymphedema      12/2021  Patient seen today for new patient evaluation.  She is here to establish care.  She will have episode of shortness with chest pain.  Describes fatigue and tiredness with shortness of breath on exertion.  Feels chest tightness and pressure sensation happens on and off.  Not clearly related to activity exertion.  No clear radiation of this pain.  She has history of DVT with chronic Coumadin treatment followed by vascular.  7/1/2024 No significant chest pain, shortness of breath, palpitations or loss of consciousness.  Occasional dizziness on getting up which was transient.  Chronic lymphedema in the legs due to old DVT.            Review of Systems   Constitutional: Negative.    HENT: Negative.     Eyes: Negative.    Respiratory: Negative.     Cardiovascular:  Positive for leg swelling.   Gastrointestinal: Negative.    Endocrine: Negative.    Genitourinary: Negative.    Musculoskeletal: Negative.    Neurological:  Positive for dizziness.   Psychiatric/Behavioral: Negative.     All other systems reviewed and are negative.        Physical Exam  Vitals and nursing note reviewed.   Constitutional:       Appearance: Normal appearance. She is obese.   HENT:      Head: Normocephalic and atraumatic.      Nose: Nose normal.   Eyes:      Conjunctiva/sclera: Conjunctivae normal.   Cardiovascular:      Rate and Rhythm: Normal rate and regular rhythm.      Pulses: Normal pulses.      Heart sounds: Murmur heard.      Harsh early systolic murmur is present with a grade of 2/6 at the upper right sternal border and upper left sternal border.   Pulmonary:      Effort: Pulmonary effort is normal.      Breath sounds: Normal breath sounds.   Abdominal:      General: Bowel sounds are normal.      Palpations: Abdomen is soft.   Musculoskeletal:         General: Normal range of motion.      Right

## 2024-07-01 NOTE — PROGRESS NOTES
1. Have you been to the ER, urgent care clinic since your last visit?  Hospitalized since your last visit?     Yes, [facility]      2.  Where do you normally have your labs drawn?   [facility]    3. Do you need any refills today?   No    4. Which local pharmacy do you use (enter pharmacy)?   [facility]    5. Which mail order pharmacy do you use (enter pharmacy)?   No     6. Are you here for surgical clearance and if so who will be doing your     procedure/surgery (care team)?   No

## 2024-07-02 LAB
CHOLESTEROL, TOTAL: 106 MG/DL (ref 110–200)
CHOLESTEROL/HDL RATIO: 2.7 (ref 0–5)
HDLC SERPL-MCNC: 39 MG/DL
LDL CHOLESTEROL: 47 MG/DL (ref 50–99)
LDL/HDL RATIO: 1.2
NON-HDL CHOLESTEROL: 67 MG/DL
TRIGL SERPL-MCNC: 97 MG/DL (ref 40–149)
VLDLC SERPL CALC-MCNC: 19 MG/DL (ref 8–30)

## 2025-01-16 RX ORDER — ATORVASTATIN CALCIUM 20 MG/1
20 TABLET, FILM COATED ORAL DAILY
Qty: 90 TABLET | Refills: 3 | Status: SHIPPED | OUTPATIENT
Start: 2025-01-16

## 2025-03-14 DIAGNOSIS — I10 ESSENTIAL (PRIMARY) HYPERTENSION: ICD-10-CM

## 2025-03-14 RX ORDER — OLMESARTAN MEDOXOMIL 20 MG/1
10 TABLET ORAL DAILY
Qty: 90 TABLET | Refills: 1 | Status: SHIPPED | OUTPATIENT
Start: 2025-03-14

## 2025-03-14 NOTE — TELEPHONE ENCOUNTER
Requested Prescriptions     Pending Prescriptions Disp Refills    olmesartan (BENICAR) 20 MG tablet 90 tablet 1     Sig: Take 0.5 tablets by mouth daily

## 2025-04-24 RX ORDER — LEVOTHYROXINE SODIUM 137 UG/1
137 TABLET ORAL DAILY
COMMUNITY

## 2025-04-24 RX ORDER — OLMESARTAN MEDOXOMIL 5 MG/1
5 TABLET ORAL DAILY
COMMUNITY

## 2025-04-24 NOTE — PERIOP NOTE
Instructions for your procedure at [facility]      Today's Date: 4/24/2025      Patient's Name: Yecenia Fuller      Procedure Date: 5/7/25        Please enter the main entrance of the hospital and check-in at the  located in the lobby.      Do NOT eat or drink anything, including candy, gum, or ice chips after midnight prior to your procedure, unless it is part of your prep.  Brush your teeth before coming to the hospital.You may swish with water, but do not swallow.  No smoking/Vaping/E-Cigarettes 24 hours prior to the day of procedure.  No alcohol 24 hours prior to the day of procedure.  No recreational drugs for one week prior to the day of procedure.  Bring Photo ID, Insurance information, and Co-pay if required on day of procedure.  Bring in pertinent legal documents, such as, Medical Power of , DNR, Advance Directive, etc.  Leave all other valuables, including money/purse, weapons at home.  Remove jewelry, including ALL body piercings, nail polish, acrylic nails, and makeup (including mascara); no lotions, powders, deodorant, and/or perfume/cologne/after shave on the skin.  Glasses and dentures may be worn to the hospital.  They must be removed prior to procedure. Please bring case/container for glasses or dentures.  11. Contacts should not be worn on day of procedure.   12. Call the office ([phone]) if you have symptoms of a cold or illness within 24-48 hours prior to your procedure.   13. AN ADULT (relative or friend 18 years or older) MUST DRIVE YOU HOME AFTER YOUR PROCEDURE.   14. Please make arrangements for a responsible adult (18 years or older) to be with you for 24 hours after your procedure.   15. TWO VISITORS will be allowed in the waiting area during your procedure.       Special Instructions:      Bring list of CURRENT medications.  Follow instructions from the office regarding Bowel Prep, Vitamins, Iron, Blood Thinners, Insulin, Seizure,

## 2025-04-29 ENCOUNTER — ANESTHESIA EVENT (OUTPATIENT)
Facility: HOSPITAL | Age: 65
End: 2025-04-29
Payer: MEDICARE

## 2025-05-07 ENCOUNTER — HOSPITAL ENCOUNTER (OUTPATIENT)
Facility: HOSPITAL | Age: 65
Setting detail: OUTPATIENT SURGERY
Discharge: HOME | End: 2025-05-07
Attending: STUDENT IN AN ORGANIZED HEALTH CARE EDUCATION/TRAINING PROGRAM | Admitting: STUDENT IN AN ORGANIZED HEALTH CARE EDUCATION/TRAINING PROGRAM
Payer: MEDICARE

## 2025-05-07 ENCOUNTER — ANESTHESIA (OUTPATIENT)
Facility: HOSPITAL | Age: 65
End: 2025-05-07
Payer: MEDICARE

## 2025-05-07 VITALS
TEMPERATURE: 97.4 F | DIASTOLIC BLOOD PRESSURE: 66 MMHG | OXYGEN SATURATION: 99 % | RESPIRATION RATE: 14 BRPM | WEIGHT: 223.3 LBS | HEART RATE: 68 BPM | BODY MASS INDEX: 35.89 KG/M2 | SYSTOLIC BLOOD PRESSURE: 125 MMHG | HEIGHT: 66 IN

## 2025-05-07 PROCEDURE — 2580000003 HC RX 258: Performed by: NURSE ANESTHETIST, CERTIFIED REGISTERED

## 2025-05-07 PROCEDURE — 7100000000 HC PACU RECOVERY - FIRST 15 MIN: Performed by: STUDENT IN AN ORGANIZED HEALTH CARE EDUCATION/TRAINING PROGRAM

## 2025-05-07 PROCEDURE — 88305 TISSUE EXAM BY PATHOLOGIST: CPT

## 2025-05-07 PROCEDURE — 3600007512: Performed by: STUDENT IN AN ORGANIZED HEALTH CARE EDUCATION/TRAINING PROGRAM

## 2025-05-07 PROCEDURE — 2709999900 HC NON-CHARGEABLE SUPPLY: Performed by: STUDENT IN AN ORGANIZED HEALTH CARE EDUCATION/TRAINING PROGRAM

## 2025-05-07 PROCEDURE — 7100000010 HC PHASE II RECOVERY - FIRST 15 MIN: Performed by: STUDENT IN AN ORGANIZED HEALTH CARE EDUCATION/TRAINING PROGRAM

## 2025-05-07 PROCEDURE — 3700000000 HC ANESTHESIA ATTENDED CARE: Performed by: STUDENT IN AN ORGANIZED HEALTH CARE EDUCATION/TRAINING PROGRAM

## 2025-05-07 PROCEDURE — 3700000001 HC ADD 15 MINUTES (ANESTHESIA): Performed by: STUDENT IN AN ORGANIZED HEALTH CARE EDUCATION/TRAINING PROGRAM

## 2025-05-07 PROCEDURE — 3600007502: Performed by: STUDENT IN AN ORGANIZED HEALTH CARE EDUCATION/TRAINING PROGRAM

## 2025-05-07 PROCEDURE — 6370000000 HC RX 637 (ALT 250 FOR IP): Performed by: STUDENT IN AN ORGANIZED HEALTH CARE EDUCATION/TRAINING PROGRAM

## 2025-05-07 PROCEDURE — 6360000002 HC RX W HCPCS: Performed by: NURSE ANESTHETIST, CERTIFIED REGISTERED

## 2025-05-07 RX ORDER — LIDOCAINE HYDROCHLORIDE 10 MG/ML
1 INJECTION, SOLUTION EPIDURAL; INFILTRATION; INTRACAUDAL; PERINEURAL
Status: DISCONTINUED | OUTPATIENT
Start: 2025-05-07 | End: 2025-05-07 | Stop reason: HOSPADM

## 2025-05-07 RX ORDER — PROPOFOL 10 MG/ML
INJECTION, EMULSION INTRAVENOUS
Status: DISCONTINUED | OUTPATIENT
Start: 2025-05-07 | End: 2025-05-07 | Stop reason: SDUPTHER

## 2025-05-07 RX ORDER — FAMOTIDINE 20 MG/1
20 TABLET, FILM COATED ORAL ONCE
Status: DISCONTINUED | OUTPATIENT
Start: 2025-05-07 | End: 2025-05-07 | Stop reason: HOSPADM

## 2025-05-07 RX ORDER — LIDOCAINE HYDROCHLORIDE 20 MG/ML
INJECTION, SOLUTION EPIDURAL; INFILTRATION; INTRACAUDAL; PERINEURAL
Status: DISCONTINUED | OUTPATIENT
Start: 2025-05-07 | End: 2025-05-07 | Stop reason: SDUPTHER

## 2025-05-07 RX ORDER — SIMETHICONE 40MG/0.6ML
SUSPENSION, DROPS(FINAL DOSAGE FORM)(ML) ORAL PRN
Status: DISCONTINUED | OUTPATIENT
Start: 2025-05-07 | End: 2025-05-07 | Stop reason: ALTCHOICE

## 2025-05-07 RX ORDER — SODIUM CHLORIDE, SODIUM LACTATE, POTASSIUM CHLORIDE, CALCIUM CHLORIDE 600; 310; 30; 20 MG/100ML; MG/100ML; MG/100ML; MG/100ML
INJECTION, SOLUTION INTRAVENOUS CONTINUOUS
Status: DISCONTINUED | OUTPATIENT
Start: 2025-05-07 | End: 2025-05-07 | Stop reason: HOSPADM

## 2025-05-07 RX ORDER — SODIUM CHLORIDE 0.9 % (FLUSH) 0.9 %
5-40 SYRINGE (ML) INJECTION PRN
Status: DISCONTINUED | OUTPATIENT
Start: 2025-05-07 | End: 2025-05-07 | Stop reason: HOSPADM

## 2025-05-07 RX ADMIN — LIDOCAINE HYDROCHLORIDE 100 MG: 20 INJECTION, SOLUTION EPIDURAL; INFILTRATION; INTRACAUDAL; PERINEURAL at 11:13

## 2025-05-07 RX ADMIN — PROPOFOL 20 MG: 10 INJECTION, EMULSION INTRAVENOUS at 11:22

## 2025-05-07 RX ADMIN — PROPOFOL 100 MG: 10 INJECTION, EMULSION INTRAVENOUS at 11:13

## 2025-05-07 RX ADMIN — SODIUM CHLORIDE, SODIUM LACTATE, POTASSIUM CHLORIDE, AND CALCIUM CHLORIDE: .6; .31; .03; .02 INJECTION, SOLUTION INTRAVENOUS at 10:30

## 2025-05-07 RX ADMIN — PROPOFOL 20 MG: 10 INJECTION, EMULSION INTRAVENOUS at 11:26

## 2025-05-07 RX ADMIN — PROPOFOL 20 MG: 10 INJECTION, EMULSION INTRAVENOUS at 11:19

## 2025-05-07 RX ADMIN — PROPOFOL 20 MG: 10 INJECTION, EMULSION INTRAVENOUS at 11:24

## 2025-05-07 RX ADMIN — PROPOFOL 20 MG: 10 INJECTION, EMULSION INTRAVENOUS at 11:16

## 2025-05-07 ASSESSMENT — PAIN - FUNCTIONAL ASSESSMENT
PAIN_FUNCTIONAL_ASSESSMENT: 0-10
PAIN_FUNCTIONAL_ASSESSMENT: 0-10
PAIN_FUNCTIONAL_ASSESSMENT: NONE - DENIES PAIN
PAIN_FUNCTIONAL_ASSESSMENT: 0-10

## 2025-05-07 NOTE — ANESTHESIA POSTPROCEDURE EVALUATION
Department of Anesthesiology  Postprocedure Note    Patient: Yecenia Fuller  MRN: [id number]  YOB: 1960  Date of evaluation: 5/7/2025    Procedure Summary       Date: 05/07/25 Room / Location: [facility] ENDO 02 / [facility] ENDOSCOPY    Anesthesia Start: 1103 Anesthesia Stop: 1128    Procedure: COLONOSCOPY w/polypectomies (Abdomen) Diagnosis:       Colon cancer screening      Obesity, unspecified class, unspecified obesity type, unspecified whether serious comorbidity present      Long term (current) use of anticoagulants      Constipation, unspecified constipation type      (Colon cancer screening [Z12.11])      (Obesity, unspecified class, unspecified obesity type, unspecified whether serious comorbidity present [E66.9])      (Long term (current) use of anticoagulants [Z79.01])      (Constipation, unspecified constipation type [K59.00])    Surgeons: Aaron Ocasio MD Responsible Provider: Anne Moreno MD    Anesthesia Type: MAC ASA Status: 3            Anesthesia Type: MAC    Robbie Phase I: Robbie Score: 10    Robbie Phase II: Robbie Score: 10    Anesthesia Post Evaluation    Patient location during evaluation: PACU  Patient participation: complete - patient participated  Level of consciousness: awake and alert  Pain score: 0  Airway patency: patent  Nausea & Vomiting: no nausea and no vomiting  Cardiovascular status: hemodynamically stable  Respiratory status: acceptable  Hydration status: euvolemic  Multimodal analgesia pain management approach  Pain management: adequate    No notable events documented.

## 2025-05-07 NOTE — ANESTHESIA PRE PROCEDURE
Department of Anesthesiology  Preprocedure Note       Name:  Yecenia Fuller   Age:  64 y.o.  :  1960                                          MRN:  [id number]         Date:  2025      Surgeon: Surgeon(s):  Aaron Ocasio MD    Procedure: Procedure(s):  COLONOSCOPY    Medications prior to admission:   Prior to Admission medications    Medication Sig Start Date End Date Taking? Authorizing Provider   olmesartan (BENICAR) 5 MG tablet Take 1 tablet by mouth daily   Yes ProviderLester MD   levothyroxine (SYNTHROID) 137 MCG tablet Take 1 tablet by mouth Daily   Yes Provider, MD Lester   Multiple Vitamins-Minerals (CENTRUM SILVER 50+WOMEN PO) Take by mouth daily   Yes ProviderLester MD   atorvastatin (LIPITOR) 20 MG tablet Take 1 tablet by mouth daily 25   Madeleine Rene APRN - NP   bumetanide (BUMEX) 2 MG tablet Take 1 tablet by mouth daily 24   Madeleine Rene APRN - NP   albuterol sulfate HFA (PROVENTIL;VENTOLIN;PROAIR) 108 (90 Base) MCG/ACT inhaler albuterol sulfate HFA 90 mcg/actuation aerosol inhaler   INHALE 2 PUFFS INTO THE LUNGS EVERY 4 HOURS AS NEEDED FOR 30 DAYS 20   ProviderLester MD   acetic acid (VOSOL) 2 % otic solution Place 1 drop in ear(s) daily as needed 8/3/21   Automatic Reconciliation, Ar   aspirin 81 MG EC tablet Take 1 tablet by mouth daily    Automatic Reconciliation, Ar   azelastine HCl 0.15 % SOLN 1 spray by Nasal route daily as needed    Automatic Reconciliation, Ar   baclofen (LIORESAL) 10 MG tablet Take 1 tablet by mouth daily    Automatic Reconciliation, Ar   vitamin D (CHOLECALCIFEROL) 25 MCG (1000 UT) TABS tablet Take 1 tablet by mouth daily    Automatic Reconciliation, Ar   hydroxychloroquine (PLAQUENIL) 200 MG tablet Take 1 tablet by mouth 2 times daily 6/3/16   Automatic Reconciliation, Ar   Lifitegrast (XIIDRA) 5 % SOLN Apply 1 drop to eye daily as needed    Automatic Reconciliation, Ar   mometasone (ELOCON) 0.1 % ointment Apply

## 2025-05-07 NOTE — H&P
[email or web address]  [phone]    Chief Complaint: 10yrs follow up    PMH:   Past Medical History:   Diagnosis Date    Autoimmune disease     RA    Cancer (HCC) 2024    left breast, Radiation completed, Lumpectomy & lymph nodes    H/O lymph node excision     left axillary node removal (Lumpectomy)    Heart murmur     followed by Dr Beckman - yearly visit    Hyperlipidemia     Hypertension     Ill-defined condition 2024    severe H/A that last 3 days post breast lumpectomy sx    Lymphedema of both lower extremities     Rheumatoid arthritis (HCC)     Sciatica     Thromboembolus (HCC)     Thyroid disease      Allergies:   Allergies   Allergen Reactions    Latex Other (See Comments)    Cephalexin Other (See Comments)    Codeine Other (See Comments)    Iodine Other (See Comments)    Penicillins Itching     Medications:   Current Facility-Administered Medications:     lidocaine PF 1 % injection 1 mL, 1 mL, IntraDERmal, Once PRN, [place] E, APRN - CRNA    famotidine (PEPCID) tablet 20 mg, 20 mg, Oral, Once, [place] E, APRN - CRNA    lactated ringers infusion, , IntraVENous, Continuous, [place] Carol E, APRN - CRNA    sodium chloride flush 0.9 % injection 5-40 mL, 5-40 mL, IntraVENous, PRN, [place] Carol E, APRN - CRNA  FH:   Family History   Problem Relation Age of Onset    Heart Attack Brother 50    Cancer Brother     Heart Attack Father 52    Atrial Fibrillation Mother     Heart Failure Mother      Social:   Social History     Socioeconomic History    Marital status: Single     Spouse name: None    Number of children: None    Years of education: None    Highest education level: None   Tobacco Use    Smoking status: Former     Current packs/day: 0.00     Types: Cigarettes     Quit date: 2022     Years since quittin.3    Smokeless tobacco: Never   Vaping Use    Vaping status: Never Used   Substance and Sexual Activity    Alcohol use: Yes     Comment: occas    Drug use: Never     Social

## 2025-07-01 ENCOUNTER — OFFICE VISIT (OUTPATIENT)
Age: 65
End: 2025-07-01
Payer: MEDICARE

## 2025-07-01 VITALS
HEIGHT: 66 IN | HEART RATE: 67 BPM | WEIGHT: 221 LBS | OXYGEN SATURATION: 96 % | SYSTOLIC BLOOD PRESSURE: 125 MMHG | BODY MASS INDEX: 35.52 KG/M2 | DIASTOLIC BLOOD PRESSURE: 83 MMHG

## 2025-07-01 DIAGNOSIS — I10 ESSENTIAL (PRIMARY) HYPERTENSION: Primary | ICD-10-CM

## 2025-07-01 DIAGNOSIS — E66.01 SEVERE OBESITY (BMI 35.0-39.9) WITH COMORBIDITY (HCC): ICD-10-CM

## 2025-07-01 DIAGNOSIS — R60.0 BILATERAL LEG EDEMA: ICD-10-CM

## 2025-07-01 DIAGNOSIS — I82.5Y1 CHRONIC EMBOLISM AND THROMBOSIS OF UNSPECIFIED DEEP VEINS OF RIGHT PROXIMAL LOWER EXTREMITY (HCC): ICD-10-CM

## 2025-07-01 DIAGNOSIS — I25.84 CORONARY ATHEROSCLEROSIS DUE TO CALCIFIED CORONARY LESION (CODE): ICD-10-CM

## 2025-07-01 DIAGNOSIS — Z85.3 HISTORY OF CANCER OF LEFT BREAST: ICD-10-CM

## 2025-07-01 PROCEDURE — 3079F DIAST BP 80-89 MM HG: CPT | Performed by: INTERNAL MEDICINE

## 2025-07-01 PROCEDURE — 1036F TOBACCO NON-USER: CPT | Performed by: INTERNAL MEDICINE

## 2025-07-01 PROCEDURE — 93000 ELECTROCARDIOGRAM COMPLETE: CPT | Performed by: INTERNAL MEDICINE

## 2025-07-01 PROCEDURE — 1090F PRES/ABSN URINE INCON ASSESS: CPT | Performed by: INTERNAL MEDICINE

## 2025-07-01 PROCEDURE — G8427 DOCREV CUR MEDS BY ELIG CLIN: HCPCS | Performed by: INTERNAL MEDICINE

## 2025-07-01 PROCEDURE — 3074F SYST BP LT 130 MM HG: CPT | Performed by: INTERNAL MEDICINE

## 2025-07-01 PROCEDURE — 1123F ACP DISCUSS/DSCN MKR DOCD: CPT | Performed by: INTERNAL MEDICINE

## 2025-07-01 PROCEDURE — G8400 PT W/DXA NO RESULTS DOC: HCPCS | Performed by: INTERNAL MEDICINE

## 2025-07-01 PROCEDURE — 99214 OFFICE O/P EST MOD 30 MIN: CPT | Performed by: INTERNAL MEDICINE

## 2025-07-01 PROCEDURE — G8417 CALC BMI ABV UP PARAM F/U: HCPCS | Performed by: INTERNAL MEDICINE

## 2025-07-01 PROCEDURE — 3017F COLORECTAL CA SCREEN DOC REV: CPT | Performed by: INTERNAL MEDICINE

## 2025-07-01 ASSESSMENT — ENCOUNTER SYMPTOMS
RESPIRATORY NEGATIVE: 1
EYES NEGATIVE: 1
GASTROINTESTINAL NEGATIVE: 1

## 2025-07-01 ASSESSMENT — PATIENT HEALTH QUESTIONNAIRE - PHQ9
SUM OF ALL RESPONSES TO PHQ QUESTIONS 1-9: 0
1. LITTLE INTEREST OR PLEASURE IN DOING THINGS: NOT AT ALL
SUM OF ALL RESPONSES TO PHQ QUESTIONS 1-9: 0
2. FEELING DOWN, DEPRESSED OR HOPELESS: NOT AT ALL
SUM OF ALL RESPONSES TO PHQ QUESTIONS 1-9: 0
SUM OF ALL RESPONSES TO PHQ QUESTIONS 1-9: 0

## 2025-07-01 NOTE — PROGRESS NOTES
Yecenia Fuller is a 64 y.o. year old female.    Follow-up of coronary calcification, hypertension, hyperlipidemia  History of DVT, lymphedema, CA Breast s/p lumpectomy and radiation 2024,      12/2021  Patient seen today for new patient evaluation.  She is here to establish care.  She will have episode of shortness with chest pain.  Describes fatigue and tiredness with shortness of breath on exertion.  Feels chest tightness and pressure sensation happens on and off.  Not clearly related to activity exertion.  No clear radiation of this pain.  She has history of DVT with chronic Coumadin treatment followed by vascular.  7/1/2024 No significant chest pain, shortness of breath, palpitations or loss of consciousness.  Occasional dizziness on getting up which was transient.  Chronic lymphedema in the legs.  History of old DVT.  7/1/2025 no significant chest pain dyspnea palpitations.  Continues to have lymphedema in the legs and uses pump.  Recent CA breast treatment without chemotherapy.            Review of Systems   Constitutional: Negative.    HENT: Negative.     Eyes: Negative.    Respiratory: Negative.     Cardiovascular:  Positive for leg swelling.   Gastrointestinal: Negative.    Endocrine: Negative.    Genitourinary: Negative.    Musculoskeletal: Negative.    Neurological: Negative.  Negative for dizziness.   Psychiatric/Behavioral: Negative.     All other systems reviewed and are negative.        Physical Exam  Vitals and nursing note reviewed.   Constitutional:       Appearance: Normal appearance. She is obese.   HENT:      Head: Normocephalic and atraumatic.      Nose: Nose normal.   Eyes:      Conjunctiva/sclera: Conjunctivae normal.   Cardiovascular:      Rate and Rhythm: Normal rate and regular rhythm.      Pulses: Normal pulses.      Heart sounds: Murmur heard.      Harsh early systolic murmur is present with a grade of 2/6 at the upper right sternal border and upper left sternal border.

## (undated) DEVICE — LINER SUCT CANSTR 3000CC PLAS SFT PRE ASSEMB W/OUT TBNG W/

## (undated) DEVICE — SYRINGE 20ML LL S/C 50

## (undated) DEVICE — SOLUTION IRRIG 1000ML H2O STRL BLT

## (undated) DEVICE — MEDI-VAC NON-CONDUCTIVE SUCTION TUBING: Brand: CARDINAL HEALTH

## (undated) DEVICE — ENDOSCOPY PUMP TUBING/ CAP SET: Brand: ERBE

## (undated) DEVICE — SYRINGE MED 10ML LUERLOCK TIP W/O SFTY DISP

## (undated) DEVICE — SYRINGE MED 50ML LUERSLIP TIP

## (undated) DEVICE — CATHETER SUCT TR FL TIP 14FR W/ O CTRL

## (undated) DEVICE — SYRINGE MED 25GA 3ML L5/8IN SUBQ PLAS W/ DETACH NDL SFTY

## (undated) DEVICE — CANNULA NSL AD TBNG L14FT STD PVC O2 CRV CONN NONFLARED NSL

## (undated) DEVICE — SNARE ENDOSCP L240CM SHTH DIA24MM LOOP W10MM POLYP RND REINF

## (undated) DEVICE — YANKAUER,SMOOTH HANDLE,HIGH CAPACITY: Brand: MEDLINE INDUSTRIES, INC.

## (undated) DEVICE — TRAP SPEC POLYP REM STRNR CLN DSGN MAGNIFYING WIND DISP

## (undated) DEVICE — GAUZE,SPONGE,4"X4",16PLY,STRL,LF,10/TRAY: Brand: MEDLINE

## (undated) DEVICE — CANNULA ORIG TL CLR W FOAM CUSHIONS AND 14FT SUPL TB 3 CHN

## (undated) DEVICE — GOWN PLASTIC FILM THMBHKS UNIV BLUE: Brand: CARDINAL HEALTH

## (undated) DEVICE — UNDERPAD INCONT W23XL36IN STD BLU POLYPR BK FLUF SFT